# Patient Record
Sex: FEMALE | Race: WHITE
[De-identification: names, ages, dates, MRNs, and addresses within clinical notes are randomized per-mention and may not be internally consistent; named-entity substitution may affect disease eponyms.]

---

## 2020-08-14 ENCOUNTER — HOSPITAL ENCOUNTER (OUTPATIENT)
Dept: HOSPITAL 84 - D.OPS | Age: 25
Discharge: HOME | End: 2020-08-14
Attending: ORTHOPAEDIC SURGERY
Payer: COMMERCIAL

## 2020-08-14 VITALS — SYSTOLIC BLOOD PRESSURE: 115 MMHG | DIASTOLIC BLOOD PRESSURE: 74 MMHG

## 2020-08-14 VITALS — HEIGHT: 72 IN | BODY MASS INDEX: 36.57 KG/M2 | WEIGHT: 270 LBS

## 2020-08-14 DIAGNOSIS — M67.432: Primary | ICD-10-CM

## 2020-08-14 LAB
ERYTHROCYTE [DISTWIDTH] IN BLOOD BY AUTOMATED COUNT: 13.7 % (ref 11.5–14.5)
HCG SERPL-ACNC: NEGATIVE M[IU]/ML
HCT VFR BLD CALC: 40.3 % (ref 36–48)
HGB BLD-MCNC: 13.3 G/DL (ref 12–16)
MCH RBC QN AUTO: 28.7 PG (ref 26–34)
MCHC RBC AUTO-ENTMCNC: 33 G/DL (ref 31–37)
MCV RBC: 86.9 FL (ref 80–100)
PLATELET # BLD: 262 10X3/UL (ref 130–400)
PMV BLD AUTO: 9.8 FL (ref 7.4–10.4)
RBC # BLD AUTO: 4.64 10X6/UL (ref 4–5.4)
WBC # BLD AUTO: 6.8 10X3/UL (ref 4.8–10.8)

## 2020-08-14 NOTE — NUR
1240
IV D/'D WITH CANNULA INTACT, PRESSURE HELD AND DRSG PLACED. DISCHARGE
INSTRUCTIONS GIVEN AND PT VERBALIZED AN UNDERSTANDING. OPERATIVE ARM
AND DRSG UNCHANGED. DISCHARGED IN STABLE CONDITION

## 2020-08-16 NOTE — OP
PATIENT NAME:  ADRIEL SEPULVEDA                              MEDICAL RECORD: [id number]
:[birth year]                                             [id number]          
                                                         ADMISSION DATE:        
SURGEON:  JOSEPH FERGUSON DO         
 
 
DATE OF OPERATION:  2020
 
PROCEDURE PERFORMED:  Left volar wrist ganglion cyst excision.
 
PREOPERATIVE DIAGNOSIS:  Ganglion cyst, left wrist.
 
POSTOPERATIVE DIAGNOSIS:  Ganglion cyst, left wrist.
 
INDICATIONS:  Ms. Sepulveda is a 24-year-old female who has had some quite painful
left wrist for quite some time.  She had an MRI showing a ganglion cyst there. 
It was right underneath the radial artery and between that and the flexor carpi
radialis tendon.  I informed her of the risks including damage to the radial
artery, infection, bleeding, need for further surgery, recurrence of the cyst. 
She is aware of all that and signed the consent.
 
SURGEON:  Joseph Ferguson MD
 
DESCRIPTION OF PROCEDURE:  The patient was taken to the operative suite, laid in
supine position where general anesthetic and LMA was placed.  She was given 2
grams Ancef preop and the left upper extremity was then prepped and draped in
sterile fashion.  Timeout was performed, everyone was in agreement as to the
correct site, side, patient, and procedure.  I then began by making an incision
over the radial aspect on the volar side of the wrist just radial to the flexor
carpi radialis tendon.  I then made blunt dissection down to the cyst, carefully
pulling the radial artery out of the way and removed the cyst and went down to
the capsule and then used a bipolar to close the gap in the hole or the hole
that the rent and the capsule had made and coagulating vessel in the area. 
Tourniquet was then let down, any bleeding was coagulated with bipolar.  Jayleen Vaughn, certified surgical assist, then closed the site with 5-0 Monocryl in an
inverted interrupted fashion.  Steri-Strips, Adaptic, 4 x 4, cast padding and
Coban was lightly wrapped on the wrist.  The tourniquet was used for 14 minutes
at the left upper extremity, was exsanguinated prior to starting to 250 mmHg,
the tourniquet was inflated.  She was awakened and taken to recovery in stable
condition.
 
BLOOD LOSS:  Minimal.
 
COMPLICATIONS:  None.
 
TRANSINT:[id number] Voice Confirmation ID: [id number] DOCUMENT ID: [id number]
                                           
                                           JOSEPH FERGUSON DO         
 
 
 
Electronically Signed by JOSEPH OL on 20 at 0757
CC:                                                             [id number]
DICTATION DATE: 20 1342     :     20 2300      [facility] 
                                                                      [id number] [place] [address]